# Patient Record
Sex: MALE | Race: WHITE | NOT HISPANIC OR LATINO | Employment: FULL TIME | URBAN - METROPOLITAN AREA
[De-identification: names, ages, dates, MRNs, and addresses within clinical notes are randomized per-mention and may not be internally consistent; named-entity substitution may affect disease eponyms.]

---

## 2018-01-09 ENCOUNTER — HOSPITAL ENCOUNTER (EMERGENCY)
Facility: OTHER | Age: 43
Discharge: HOME | End: 2018-01-09
Attending: EMERGENCY MEDICINE
Payer: COMMERCIAL

## 2018-01-09 VITALS
BODY MASS INDEX: 29.82 KG/M2 | SYSTOLIC BLOOD PRESSURE: 143 MMHG | WEIGHT: 225 LBS | RESPIRATION RATE: 15 BRPM | OXYGEN SATURATION: 97 % | HEIGHT: 73 IN | DIASTOLIC BLOOD PRESSURE: 82 MMHG | HEART RATE: 64 BPM | TEMPERATURE: 98 F

## 2018-01-09 DIAGNOSIS — K29.00 ACUTE GASTRITIS, PRESENCE OF BLEEDING UNSPECIFIED, UNSPECIFIED GASTRITIS TYPE: Primary | ICD-10-CM

## 2018-01-09 LAB
ALBUMIN SERPL BCP-MCNC: 3.4 G/DL
ALP SERPL-CCNC: 86 U/L
ALT SERPL W/O P-5'-P-CCNC: 54 U/L
ANION GAP SERPL CALC-SCNC: 3 MMOL/L
AST SERPL-CCNC: 41 U/L
BASOPHILS # BLD AUTO: 0.02 K/UL
BASOPHILS NFR BLD: 0.3 %
BILIRUB SERPL-MCNC: 0.9 MG/DL
BILIRUB UR QL STRIP: NEGATIVE
BUN SERPL-MCNC: 9 MG/DL
CALCIUM SERPL-MCNC: 8.7 MG/DL
CHLORIDE SERPL-SCNC: 107 MMOL/L
CLARITY UR: CLEAR
CO2 SERPL-SCNC: 30 MMOL/L
COLOR UR: YELLOW
CREAT SERPL-MCNC: 1 MG/DL
DIFFERENTIAL METHOD: ABNORMAL
EOSINOPHIL # BLD AUTO: 0.1 K/UL
EOSINOPHIL NFR BLD: 1.1 %
ERYTHROCYTE [DISTWIDTH] IN BLOOD BY AUTOMATED COUNT: 13 %
EST. GFR  (AFRICAN AMERICAN): >60 ML/MIN/1.73 M^2
EST. GFR  (NON AFRICAN AMERICAN): >60 ML/MIN/1.73 M^2
GLUCOSE SERPL-MCNC: 84 MG/DL
GLUCOSE UR QL STRIP: NEGATIVE
HCT VFR BLD AUTO: 48.6 %
HGB BLD-MCNC: 15.4 G/DL
HGB UR QL STRIP: ABNORMAL
KETONES UR QL STRIP: NEGATIVE
LEUKOCYTE ESTERASE UR QL STRIP: NEGATIVE
LIPASE SERPL-CCNC: 6 U/L
LYMPHOCYTES # BLD AUTO: 2.6 K/UL
LYMPHOCYTES NFR BLD: 35.8 %
MCH RBC QN AUTO: 29.6 PG
MCHC RBC AUTO-ENTMCNC: 31.7 G/DL
MCV RBC AUTO: 94 FL
MONOCYTES # BLD AUTO: 0.4 K/UL
MONOCYTES NFR BLD: 5.8 %
NEUTROPHILS # BLD AUTO: 4.1 K/UL
NEUTROPHILS NFR BLD: 56.6 %
NITRITE UR QL STRIP: NEGATIVE
PH UR STRIP: 8 [PH] (ref 5–8)
PLATELET # BLD AUTO: 242 K/UL
PMV BLD AUTO: 9.4 FL
POTASSIUM SERPL-SCNC: 4.2 MMOL/L
PROT SERPL-MCNC: 6.7 G/DL
PROT UR QL STRIP: NEGATIVE
RBC # BLD AUTO: 5.2 M/UL
SODIUM SERPL-SCNC: 140 MMOL/L
SP GR UR STRIP: 1.01 (ref 1–1.03)
URN SPEC COLLECT METH UR: ABNORMAL
UROBILINOGEN UR STRIP-ACNC: 1 EU/DL
WBC # BLD AUTO: 7.29 K/UL

## 2018-01-09 PROCEDURE — 96374 THER/PROPH/DIAG INJ IV PUSH: CPT

## 2018-01-09 PROCEDURE — 93005 ELECTROCARDIOGRAM TRACING: CPT

## 2018-01-09 PROCEDURE — 85025 COMPLETE CBC W/AUTO DIFF WBC: CPT

## 2018-01-09 PROCEDURE — 63600175 PHARM REV CODE 636 W HCPCS: Performed by: EMERGENCY MEDICINE

## 2018-01-09 PROCEDURE — 99284 EMERGENCY DEPT VISIT MOD MDM: CPT | Mod: 25

## 2018-01-09 PROCEDURE — 80053 COMPREHEN METABOLIC PANEL: CPT

## 2018-01-09 PROCEDURE — 83690 ASSAY OF LIPASE: CPT

## 2018-01-09 PROCEDURE — S0028 INJECTION, FAMOTIDINE, 20 MG: HCPCS | Performed by: EMERGENCY MEDICINE

## 2018-01-09 PROCEDURE — 25000003 PHARM REV CODE 250: Performed by: EMERGENCY MEDICINE

## 2018-01-09 PROCEDURE — 93010 ELECTROCARDIOGRAM REPORT: CPT | Mod: ,,, | Performed by: INTERNAL MEDICINE

## 2018-01-09 PROCEDURE — 96375 TX/PRO/DX INJ NEW DRUG ADDON: CPT

## 2018-01-09 PROCEDURE — 81003 URINALYSIS AUTO W/O SCOPE: CPT

## 2018-01-09 RX ORDER — OMEPRAZOLE 40 MG/1
40 CAPSULE, DELAYED RELEASE ORAL DAILY
Qty: 30 CAPSULE | Refills: 2 | Status: SHIPPED | OUTPATIENT
Start: 2018-01-09 | End: 2019-02-11 | Stop reason: ALTCHOICE

## 2018-01-09 RX ORDER — FAMOTIDINE 10 MG/ML
20 INJECTION INTRAVENOUS
Status: COMPLETED | OUTPATIENT
Start: 2018-01-09 | End: 2018-01-09

## 2018-01-09 RX ORDER — SUCRALFATE 1 G/1
1 TABLET ORAL
Qty: 56 TABLET | Refills: 1 | Status: SHIPPED | OUTPATIENT
Start: 2018-01-09 | End: 2019-02-11 | Stop reason: ALTCHOICE

## 2018-01-09 RX ORDER — ONDANSETRON 2 MG/ML
4 INJECTION INTRAMUSCULAR; INTRAVENOUS
Status: COMPLETED | OUTPATIENT
Start: 2018-01-09 | End: 2018-01-09

## 2018-01-09 RX ADMIN — SODIUM CHLORIDE 1000 ML: 0.9 INJECTION, SOLUTION INTRAVENOUS at 01:01

## 2018-01-09 RX ADMIN — FAMOTIDINE 20 MG: 10 INJECTION, SOLUTION INTRAVENOUS at 12:01

## 2018-01-09 RX ADMIN — ONDANSETRON 4 MG: 2 INJECTION, SOLUTION INTRAMUSCULAR; INTRAVENOUS at 12:01

## 2018-01-09 RX ADMIN — LIDOCAINE HYDROCHLORIDE: 20 SOLUTION ORAL; TOPICAL at 12:01

## 2018-01-09 NOTE — ED PROVIDER NOTES
"Encounter Date: 1/9/2018    SCRIBE #1 NOTE: I, Noni Hernandez, am scribing for, and in the presence of, Dr. Dangelo.       History     Chief Complaint   Patient presents with    Abdominal Pain     epigastric pain and LUQ pain x1 month w/ decreased appetite or feeling of fullness w/o intake of food worsening to 10/10 upon waking today     Time seen by provider: 12:16 PM    This is a 42 y.o. male, with history of GERD, who presents with complaint of abdominal pain which started years ago but worsened as of this morning. Onset occurred while the patient was getting ready for work and has remained constant since. Abdominal pain is rated "8 or 9/10" in severity and is located on the left side of the abdomen with radiation to the central abdomen. Patient describes central abdominal pain as "tightness." He also states that "I can't eat anything I just feel full." The patient denies fever, chills, nausea, vomiting, diarrhea, constipation, back pain, chest pain, shortness of breath, cough, congestion, rhinorrhea, sore throat, dysuria, hematochezia, light-headedness, or dizziness. His last bowel movement was one day ago and normal.     Patient states that he is compliant to daily Prilosec and notes that pain will radiate upward toward throat with missed doses he does not have any chest pain at present. He admits to occasional use of Aleve for headaches, but does not take any NSAIDs regularly. Patient also admits to occasional use of ETOH, but denies use of tobacco or illicit drugs. He has not yet had EGD scope performed - he states that he followed up with a GI doctor last week but was unable to pay the money they were asking for endoscope. The patient does not identify a surgical history of appendectomy or cholecystectomy. He reports no other pertinent medical issues. Patient has no additional complaints.      The history is provided by the patient.     Review of patient's allergies indicates:  No Known Allergies  No past " "medical history on file.  No past surgical history on file.  No family history on file.  Social History   Substance Use Topics    Smoking status: Never Smoker    Smokeless tobacco: Former User     Types: Snuff    Alcohol use Yes      Comment: "occasionally" once a month     Review of Systems   Constitutional: Positive for appetite change (decreased). Negative for chills and fever.   HENT: Negative for congestion, rhinorrhea and sore throat.    Respiratory: Negative for cough and shortness of breath.    Cardiovascular: Negative for chest pain.   Gastrointestinal: Positive for abdominal pain. Negative for blood in stool, constipation, diarrhea, nausea and vomiting.   Genitourinary: Negative for dysuria.   Musculoskeletal: Negative for back pain.   Neurological: Negative for dizziness and light-headedness.       Physical Exam     Initial Vitals [01/09/18 1139]   BP Pulse Resp Temp SpO2   (!) 171/95 92 18 98.1 °F (36.7 °C) 97 %      MAP       120.33             Physical Exam    Nursing note and vitals reviewed.  Constitutional: He appears well-developed and well-nourished. He is not diaphoretic. No distress.   HENT:   Head: Normocephalic and atraumatic.   Mouth/Throat: Oropharynx is clear and moist.   Eyes: Conjunctivae and EOM are normal. Pupils are equal, round, and reactive to light. No scleral icterus.   Neck: Normal range of motion. Neck supple.   Cardiovascular: Normal rate, regular rhythm and normal heart sounds. Exam reveals no gallop and no friction rub.    No murmur heard.  Pulmonary/Chest: Breath sounds normal. No respiratory distress. He has no wheezes. He has no rhonchi. He has no rales.   Abdominal: Soft. He exhibits no distension. There is tenderness. There is no rebound and no guarding.   Hyperactive bowel sounds. Epigastric tenderness to palpation.   Genitourinary:   Genitourinary Comments: Single external hemorrhoid which is not bleeding or thrombosed. No stool present in the rectal vault. No " palpable internal hemorrhoids.   Musculoskeletal: Normal range of motion. He exhibits no edema or tenderness.   Neurological: He is alert and oriented to person, place, and time.   Skin: Skin is warm and dry. Capillary refill takes less than 2 seconds. No rash and no abscess noted. No erythema. No pallor.   Psychiatric: He has a normal mood and affect. His behavior is normal. Judgment and thought content normal.         ED Course   Procedures  Labs Reviewed   CBC W/ AUTO DIFFERENTIAL - Abnormal; Notable for the following:        Result Value    MCHC 31.7 (*)     All other components within normal limits   COMPREHENSIVE METABOLIC PANEL - Abnormal; Notable for the following:     CO2 30 (*)     Albumin 3.4 (*)     AST 41 (*)     ALT 54 (*)     Anion Gap 3 (*)     All other components within normal limits   URINALYSIS - Abnormal; Notable for the following:     Occult Blood UA Trace (*)     All other components within normal limits   LIPASE     Imaging Results          X-Ray Abdomen Flat And Erect (Final result)  Result time 01/09/18 12:57:45    Final result by Yue Ahmadi MD (01/09/18 12:57:45)                 Impression:    No dilated bowel loops to suggest obstruction.      Electronically signed by: YUE AHMADI MD  Date:     01/09/18  Time:    12:57              Narrative:    EXAM: ABDOMEN FLAT AND ERECT    CLINICAL INDICATION:  epigastric pain.    TECHNIQUE:  Supine and upright views of the abdomen were obtained.    FINDINGS:  No dilated bowel loops are seen.   There is no evidence of subdiaphragmatic free air. There is no evidence of mass effect.  No abnormal calcifications are detected. No osseous abnormalities are seen.                              EKG Readings: (Independently Interpreted)   Initial Reading: No STEMI.   Normal sinus rhythm at a rate of 89 bpm. No STEMI. Motion artifact present.          Medical Decision Making:   Independently Interpreted Test(s):   I have ordered and independently  interpreted X-rays - see prior notes.  I have ordered and independently interpreted EKG Reading(s) - see prior notes  Clinical Tests:   Lab Tests: Ordered and Reviewed  Radiological Study: Ordered and Reviewed  Medical Tests: Ordered and Reviewed  ED Management:  Urgent evaluation a 42-year-old male with complaint of epigastric abdominal pain, worsened from baseline.  Vital signs reveal hypertension, afebrile.  Blood pressure improved without specific intervention during ER course.  Physical exam revealed epigastric tenderness, but a nonsurgical abdomen.  X-ray shows no perforation or obstruction.  Labs are benign with no evidence of UTI, pancreatitis, no anemia, no major electrolyte disturbance or renal insufficiency.  There was it trivial elevation in LFTs but not commensurate with true hepatitis, and this is not the location of patient's pain.  He was treated with fluids, GI cocktail, and Pepcid with improvement.  He was discharged in improved condition to take Carafate and high-dose Prilosec.  He was advised to follow-up with GI as he will need endoscopy for further evaluation.            Scribe Attestation:   Scribe #1: I performed the above scribed service and the documentation accurately describes the services I performed. I attest to the accuracy of the note.    Attending Attestation:           Physician Attestation for Scribe:  Physician Attestation Statement for Scribe #1: I, Dr. Dangelo, reviewed documentation, as scribed by Noni Hernandez in my presence, and it is both accurate and complete.                 ED Course      Clinical Impression:     1. Acute gastritis, presence of bleeding unspecified, unspecified gastritis type                                 Aurelia Dangelo MD  01/09/18 1855

## 2019-02-11 ENCOUNTER — OFFICE VISIT (OUTPATIENT)
Dept: PRIMARY CARE CLINIC | Facility: CLINIC | Age: 44
End: 2019-02-11
Payer: COMMERCIAL

## 2019-02-11 VITALS
WEIGHT: 214.31 LBS | SYSTOLIC BLOOD PRESSURE: 155 MMHG | OXYGEN SATURATION: 95 % | DIASTOLIC BLOOD PRESSURE: 93 MMHG | RESPIRATION RATE: 18 BRPM | HEIGHT: 73 IN | HEART RATE: 84 BPM | BODY MASS INDEX: 28.4 KG/M2 | TEMPERATURE: 98 F

## 2019-02-11 DIAGNOSIS — E78.00 HYPERCHOLESTEROLEMIA: ICD-10-CM

## 2019-02-11 DIAGNOSIS — Z12.5 PROSTATE CANCER SCREENING: ICD-10-CM

## 2019-02-11 DIAGNOSIS — Z13.6 ENCOUNTER FOR SCREENING FOR CARDIOVASCULAR DISORDERS: ICD-10-CM

## 2019-02-11 DIAGNOSIS — Z00.00 ROUTINE PHYSICAL EXAMINATION: Primary | ICD-10-CM

## 2019-02-11 DIAGNOSIS — R74.01 TRANSAMINITIS: ICD-10-CM

## 2019-02-11 DIAGNOSIS — K21.9 GASTROESOPHAGEAL REFLUX DISEASE WITHOUT ESOPHAGITIS: ICD-10-CM

## 2019-02-11 PROCEDURE — 99999 PR PBB SHADOW E&M-EST. PATIENT-LVL III: CPT | Mod: PBBFAC,,, | Performed by: NURSE PRACTITIONER

## 2019-02-11 PROCEDURE — 99396 PR PREVENTIVE VISIT,EST,40-64: ICD-10-PCS | Mod: S$GLB,,, | Performed by: NURSE PRACTITIONER

## 2019-02-11 PROCEDURE — 99999 PR PBB SHADOW E&M-EST. PATIENT-LVL III: ICD-10-PCS | Mod: PBBFAC,,, | Performed by: NURSE PRACTITIONER

## 2019-02-11 PROCEDURE — 99396 PREV VISIT EST AGE 40-64: CPT | Mod: S$GLB,,, | Performed by: NURSE PRACTITIONER

## 2019-02-11 RX ORDER — OMEPRAZOLE 20 MG/1
20 CAPSULE, DELAYED RELEASE ORAL DAILY PRN
COMMUNITY

## 2019-02-11 NOTE — PROGRESS NOTES
"Chief Complaint  Chief Complaint   Patient presents with    Annual Exam       HPI    Williams Coronado is a 43 y.o. male that presents for routine physical examination, establish care.  Last primary care physician Dr. Ledesma in 2009.  No recent lab work.  Patient with no complaints at this time.  Presents to clinic requesting a physical for insurance.  Patient is a nonsmoker with occasional alcohol intake socially.  Denies drug use.  Exercise routine with daily workouts.  Only medical history includes GERD which patient reports is controlled with Prilosec 20 mg as needed for symptoms.      PAST MEDICAL HISTORY:  History reviewed. No pertinent past medical history.    PAST SURGICAL HISTORY:  Past Surgical History:   Procedure Laterality Date    TONSILLECTOMY         SOCIAL HISTORY:  Social History     Socioeconomic History    Marital status:      Spouse name: Not on file    Number of children: Not on file    Years of education: Not on file    Highest education level: Not on file   Social Needs    Financial resource strain: Not on file    Food insecurity - worry: Not on file    Food insecurity - inability: Not on file    Transportation needs - medical: Not on file    Transportation needs - non-medical: Not on file   Occupational History    Not on file   Tobacco Use    Smoking status: Never Smoker    Smokeless tobacco: Former User     Types: Snuff   Substance and Sexual Activity    Alcohol use: Yes     Comment: "occasionally" once a month    Drug use: No    Sexual activity: Yes   Other Topics Concern    Not on file   Social History Narrative    Not on file       FAMILY HISTORY:  Family History   Family history unknown: Yes       ALLERGIES AND MEDICATIONS: updated and reviewed.  Review of patient's allergies indicates:  No Known Allergies  No current outpatient medications on file.     No current facility-administered medications for this visit.          ROS  Review of Systems " "  Constitutional: Negative for chills, diaphoresis, fever and unexpected weight change.   HENT: Negative for ear pain, sinus pain and sore throat.    Respiratory: Negative for cough, shortness of breath and wheezing.    Cardiovascular: Negative for chest pain and palpitations.   Gastrointestinal: Negative for blood in stool, diarrhea, nausea and vomiting.   Genitourinary: Negative for dysuria and hematuria.   Skin: Negative for rash and wound.   Neurological: Negative for seizures, syncope and headaches.   Psychiatric/Behavioral: Negative for dysphoric mood and sleep disturbance. The patient is not nervous/anxious.            PHYSICAL EXAM  Vitals:    02/11/19 0901   BP: (!) 140/93   BP Location: Right arm   Patient Position: Sitting   BP Method: Large (Automatic)   Pulse: 84   Resp: 18   Temp: 97.9 °F (36.6 °C)   TempSrc: Oral   SpO2: 95%   Weight: 97.2 kg (214 lb 4.8 oz)   Height: 6' 1" (1.854 m)    Body mass index is 28.27 kg/m².  Weight: 97.2 kg (214 lb 4.8 oz)   Height: 6' 1" (185.4 cm)     Physical Exam   Constitutional: He is oriented to person, place, and time. He appears well-developed and well-nourished.   HENT:   Head: Normocephalic.   Right Ear: Tympanic membrane normal.   Left Ear: Tympanic membrane normal.   Mouth/Throat: Uvula is midline, oropharynx is clear and moist and mucous membranes are normal.   Eyes: Conjunctivae are normal.   Cardiovascular: Normal rate, regular rhythm, normal heart sounds and normal pulses.   No murmur heard.  Pulses:       Radial pulses are 2+ on the right side, and 2+ on the left side.   No LE swelling noted   Pulmonary/Chest: Effort normal and breath sounds normal. He has no wheezes.   Abdominal: Soft. Bowel sounds are normal. There is no tenderness.   Musculoskeletal: He exhibits no edema.   Lymphadenopathy:     He has no cervical adenopathy.   Neurological: He is alert and oriented to person, place, and time.   Skin: Skin is warm and dry. No rash noted.   Psychiatric: " He has a normal mood and affect.         Health Maintenance       Date Due Completion Date    Lipid Panel 1973 ---    TETANUS VACCINE 08/08/1991 ---    Influenza Vaccine 08/01/2018 ---            Assessment & Plan    Problem List Items Addressed This Visit        Unprioritized    Gastroesophageal reflux disease without esophagitis      Other Visit Diagnoses     Routine physical examination    -  Primary    Relevant Orders    CBC auto differential    Comprehensive metabolic panel    TSH    Encounter for screening for cardiovascular disorders        Relevant Orders    Lipid panel    Prostate cancer screening        Relevant Orders    PSA, Screening               Follow-up: Follow-up in about 6 months (around 8/11/2019).    Niru Castelan    Medication List with Changes/Refills   Discontinued Medications    OMEPRAZOLE (PRILOSEC) 40 MG CAPSULE    Take 1 capsule (40 mg total) by mouth once daily.    SUCRALFATE (CARAFATE) 1 GRAM TABLET    Take 1 tablet (1 g total) by mouth 4 (four) times daily before meals and nightly.

## 2019-03-13 ENCOUNTER — TELEPHONE (OUTPATIENT)
Dept: PRIMARY CARE CLINIC | Facility: CLINIC | Age: 44
End: 2019-03-13

## 2019-03-13 DIAGNOSIS — E78.00 HYPERCHOLESTEROLEMIA: Primary | ICD-10-CM

## 2019-03-13 RX ORDER — ATORVASTATIN CALCIUM 10 MG/1
10 TABLET, FILM COATED ORAL DAILY
Qty: 90 TABLET | Refills: 3 | Status: SHIPPED | OUTPATIENT
Start: 2019-03-13 | End: 2019-03-15 | Stop reason: SDUPTHER

## 2019-03-14 ENCOUNTER — TELEPHONE (OUTPATIENT)
Dept: PRIMARY CARE CLINIC | Facility: CLINIC | Age: 44
End: 2019-03-14

## 2019-03-14 DIAGNOSIS — E78.00 HYPERCHOLESTEROLEMIA: Primary | ICD-10-CM

## 2019-03-14 NOTE — TELEPHONE ENCOUNTER
----- Message from Beth Sparrow sent at 3/14/2019  8:45 AM CDT -----  Contact: Patient  Type: Needs Medical Advice    Who Called:  Williams patient  Symptoms (please be specific):  N/A  How long has patient had these symptoms:  N/A  Pharmacy name and phone #:    [facility]  [place]  Best Call Back Number: [phone]  Additional Information: Calling to ask if he can change Rx atorvastatin (LIPITOR) 10 MG tablet to Repatha Sure Shot Pen. Please advise. Thanks.

## 2019-03-15 RX ORDER — ATORVASTATIN CALCIUM 10 MG/1
10 TABLET, FILM COATED ORAL DAILY
Qty: 90 TABLET | Refills: 3 | Status: SHIPPED | OUTPATIENT
Start: 2019-03-15 | End: 2019-06-19

## 2019-03-15 NOTE — TELEPHONE ENCOUNTER
Discussed with patient that I do not think that starting on repatha is an appropriate step in his care.   Generally, it is recommended that a patient failed therapy on the maximum dose of statins for at least 3 months before starting with path.  Offered a consult for Cardiology for any further questions.  Patient agreed to attempt Lipitor at this time.  Instructed him will closely monitor his LFTs in the future.

## 2019-03-15 NOTE — TELEPHONE ENCOUNTER
----- Message from Jonathan Ye sent at 3/15/2019 10:51 AM CDT -----  Type: Needs Medical Advice    Who Called:  Patient    Pharmacy name and phone #:    [facility]  [address], [place] [address] [phone]    Best Call Back Number: [phone]  Additional Information: Patient states that he would like a callback as soon as possible regarding his prescriptions.  He states that he needs Repatha instead of the Lipitor with Statin and he needs it sent to the [facility] instead of [facility].

## 2019-03-22 ENCOUNTER — TELEPHONE (OUTPATIENT)
Dept: PRIMARY CARE CLINIC | Facility: CLINIC | Age: 44
End: 2019-03-22

## 2019-03-22 NOTE — TELEPHONE ENCOUNTER
----- Message from Beth Sparrow sent at 3/22/2019 10:59 AM CDT -----  Contact: Patient  Type: Needs Medical Advice    Who Called:  Williams patient  Symptoms (please be specific):  N/A  How long has patient had these symptoms:  N/A  Pharmacy name and phone #:  N?A  Best Call Back Number: [phone]  Additional Information: Calling to let you know the Rx atorvastatin (LIPITOR) 10 MG tablet is making him nauseated all the time. Please call him. Thanks.

## 2019-03-25 NOTE — TELEPHONE ENCOUNTER
Please let patient know that Dr. Ledesma would like him to continue for 1-2 more weeks to evaluate for improvement.

## 2019-06-05 ENCOUNTER — PATIENT OUTREACH (OUTPATIENT)
Dept: ADMINISTRATIVE | Facility: HOSPITAL | Age: 44
End: 2019-06-05

## 2019-06-05 NOTE — LETTER
June 5, 2019    Williams Coronado  [facility] [place] [address] [facility]  Jameson Ledesma MD   [address] [place] [address]  Phone: [phone]  Fax: [phone]   Dear Richard, [facility] is committed to your overall health.  To help you get the most out of each of your visits, we will review your information to make sure you are up to date on all of your recommended tests and/or procedures.      Dr. Ledesma has found that you may be due for a Tetanus Vaccine.      If you have had any of the above done at another facility, please bring the records or information with you so that your record at [facility] will be complete. You can also sign a release of information at your upcoming appointment so we can obtain the tests/procedures. If you are currently taking medication, please bring it with you to your appointment for review.    Also, if you have any type of Advanced Directives, please bring them with you to your office visit so we may scan them into your chart.       Thanks,    MD Zechariah Mon LPN  Clinical Care Coordinator

## 2019-06-19 ENCOUNTER — CLINICAL SUPPORT (OUTPATIENT)
Dept: PRIMARY CARE CLINIC | Facility: CLINIC | Age: 44
End: 2019-06-19
Payer: COMMERCIAL

## 2019-06-19 DIAGNOSIS — E78.00 HYPERCHOLESTEROLEMIA: ICD-10-CM

## 2019-06-19 LAB
ALBUMIN SERPL BCP-MCNC: 4.3 G/DL (ref 3.5–5.2)
ALP SERPL-CCNC: 79 U/L (ref 38–126)
ALT SERPL W/O P-5'-P-CCNC: 36 U/L (ref 17–63)
ANION GAP SERPL CALC-SCNC: 9 MMOL/L (ref 8–16)
AST SERPL-CCNC: 36 U/L (ref 15–41)
BILIRUB SERPL-MCNC: 1.2 MG/DL (ref 0.3–1.2)
BUN SERPL-MCNC: 18 MG/DL (ref 6–20)
CALCIUM SERPL-MCNC: 9.4 MG/DL (ref 8.6–10)
CHLORIDE SERPL-SCNC: 102 MMOL/L (ref 101–111)
CHOLEST SERPL-MCNC: 241 MG/DL (ref 80–200)
CHOLEST/HDLC SERPL: 5.5 {RATIO} (ref 2–5)
CO2 SERPL-SCNC: 29 MMOL/L (ref 23–29)
CREAT SERPL-MCNC: 1 MG/DL (ref 0.5–1.4)
EST. GFR  (AFRICAN AMERICAN): >60 ML/MIN/1.73 M^2
EST. GFR  (NON AFRICAN AMERICAN): >60 ML/MIN/1.73 M^2
GLUCOSE SERPL-MCNC: 90 MG/DL (ref 74–118)
HDLC SERPL-MCNC: 44 MG/DL (ref 40–75)
HDLC SERPL: 18.3 % (ref 20–50)
LDLC SERPL CALC-MCNC: 162 MG/DL
NONHDLC SERPL-MCNC: 197 MG/DL
POTASSIUM SERPL-SCNC: 3.7 MMOL/L (ref 3.5–5.1)
PROT SERPL-MCNC: 7.5 G/DL (ref 6–8.4)
SODIUM SERPL-SCNC: 140 MMOL/L (ref 136–145)
TRIGL SERPL-MCNC: 175 MG/DL (ref 30–150)

## 2019-06-19 PROCEDURE — 80053 COMPREHEN METABOLIC PANEL: CPT

## 2019-06-19 PROCEDURE — 80061 LIPID PANEL: CPT

## 2019-07-12 DIAGNOSIS — E78.2 MIXED HYPERLIPIDEMIA: Primary | ICD-10-CM

## 2020-01-24 NOTE — ADDENDUM NOTE
-- DO NOT REPLY / DO NOT REPLY ALL --  -- Message is from the [facility]--    General Patient Message      Reason for Call: Patient returned a call he believes was regarding echo results. Please give him a call back asap.    Caller Information       Type Contact Phone    01/24/2020 08:50 AM Phone (Incoming) Javier Herbert (Self) [phone] (H)          Alternative phone number: none    Turnaround time given to caller:   \"This message will be sent to [state Provider's name]. The clinical team will fulfill your request as soon as they review your message.\"}     Addended by: ABEL JUAN on: 2/11/2019 01:21 PM     Modules accepted: Orders

## 2021-09-20 ENCOUNTER — IMMUNIZATION (OUTPATIENT)
Dept: PHARMACY | Facility: CLINIC | Age: 46
End: 2021-09-20
Payer: COMMERCIAL

## 2021-09-20 DIAGNOSIS — Z23 NEED FOR VACCINATION: Primary | ICD-10-CM

## 2022-01-26 ENCOUNTER — OFFICE VISIT (OUTPATIENT)
Dept: PRIMARY CARE CLINIC | Facility: CLINIC | Age: 47
End: 2022-01-26
Payer: COMMERCIAL

## 2022-01-26 VITALS
WEIGHT: 191.5 LBS | RESPIRATION RATE: 16 BRPM | DIASTOLIC BLOOD PRESSURE: 76 MMHG | OXYGEN SATURATION: 95 % | HEART RATE: 85 BPM | TEMPERATURE: 98 F | HEIGHT: 73 IN | SYSTOLIC BLOOD PRESSURE: 120 MMHG | BODY MASS INDEX: 25.38 KG/M2

## 2022-01-26 DIAGNOSIS — Z76.89 ENCOUNTER TO ESTABLISH CARE: Primary | ICD-10-CM

## 2022-01-26 DIAGNOSIS — Z11.4 ENCOUNTER FOR SCREENING FOR HIV: ICD-10-CM

## 2022-01-26 DIAGNOSIS — Z11.59 NEED FOR HEPATITIS C SCREENING TEST: ICD-10-CM

## 2022-01-26 DIAGNOSIS — Z13.6 SCREENING FOR CARDIOVASCULAR CONDITION: ICD-10-CM

## 2022-01-26 DIAGNOSIS — N52.9 ERECTILE DYSFUNCTION, UNSPECIFIED ERECTILE DYSFUNCTION TYPE: ICD-10-CM

## 2022-01-26 PROCEDURE — 93010 EKG 12-LEAD: ICD-10-PCS | Mod: S$GLB,,, | Performed by: INTERNAL MEDICINE

## 2022-01-26 PROCEDURE — 3074F PR MOST RECENT SYSTOLIC BLOOD PRESSURE < 130 MM HG: ICD-10-PCS | Mod: CPTII,S$GLB,, | Performed by: STUDENT IN AN ORGANIZED HEALTH CARE EDUCATION/TRAINING PROGRAM

## 2022-01-26 PROCEDURE — 93005 ELECTROCARDIOGRAM TRACING: CPT | Mod: S$GLB,,, | Performed by: STUDENT IN AN ORGANIZED HEALTH CARE EDUCATION/TRAINING PROGRAM

## 2022-01-26 PROCEDURE — 3008F BODY MASS INDEX DOCD: CPT | Mod: CPTII,S$GLB,, | Performed by: STUDENT IN AN ORGANIZED HEALTH CARE EDUCATION/TRAINING PROGRAM

## 2022-01-26 PROCEDURE — 3008F PR BODY MASS INDEX (BMI) DOCUMENTED: ICD-10-PCS | Mod: CPTII,S$GLB,, | Performed by: STUDENT IN AN ORGANIZED HEALTH CARE EDUCATION/TRAINING PROGRAM

## 2022-01-26 PROCEDURE — 3078F DIAST BP <80 MM HG: CPT | Mod: CPTII,S$GLB,, | Performed by: STUDENT IN AN ORGANIZED HEALTH CARE EDUCATION/TRAINING PROGRAM

## 2022-01-26 PROCEDURE — 93010 ELECTROCARDIOGRAM REPORT: CPT | Mod: S$GLB,,, | Performed by: INTERNAL MEDICINE

## 2022-01-26 PROCEDURE — 1160F PR REVIEW ALL MEDS BY PRESCRIBER/CLIN PHARMACIST DOCUMENTED: ICD-10-PCS | Mod: CPTII,S$GLB,, | Performed by: STUDENT IN AN ORGANIZED HEALTH CARE EDUCATION/TRAINING PROGRAM

## 2022-01-26 PROCEDURE — 1159F MED LIST DOCD IN RCRD: CPT | Mod: CPTII,S$GLB,, | Performed by: STUDENT IN AN ORGANIZED HEALTH CARE EDUCATION/TRAINING PROGRAM

## 2022-01-26 PROCEDURE — 1160F RVW MEDS BY RX/DR IN RCRD: CPT | Mod: CPTII,S$GLB,, | Performed by: STUDENT IN AN ORGANIZED HEALTH CARE EDUCATION/TRAINING PROGRAM

## 2022-01-26 PROCEDURE — 3078F PR MOST RECENT DIASTOLIC BLOOD PRESSURE < 80 MM HG: ICD-10-PCS | Mod: CPTII,S$GLB,, | Performed by: STUDENT IN AN ORGANIZED HEALTH CARE EDUCATION/TRAINING PROGRAM

## 2022-01-26 PROCEDURE — 1159F PR MEDICATION LIST DOCUMENTED IN MEDICAL RECORD: ICD-10-PCS | Mod: CPTII,S$GLB,, | Performed by: STUDENT IN AN ORGANIZED HEALTH CARE EDUCATION/TRAINING PROGRAM

## 2022-01-26 PROCEDURE — 93005 EKG 12-LEAD: ICD-10-PCS | Mod: S$GLB,,, | Performed by: STUDENT IN AN ORGANIZED HEALTH CARE EDUCATION/TRAINING PROGRAM

## 2022-01-26 PROCEDURE — 99999 PR PBB SHADOW E&M-EST. PATIENT-LVL III: CPT | Mod: PBBFAC,,, | Performed by: STUDENT IN AN ORGANIZED HEALTH CARE EDUCATION/TRAINING PROGRAM

## 2022-01-26 PROCEDURE — 99214 OFFICE O/P EST MOD 30 MIN: CPT | Mod: S$GLB,,, | Performed by: STUDENT IN AN ORGANIZED HEALTH CARE EDUCATION/TRAINING PROGRAM

## 2022-01-26 PROCEDURE — 99999 PR PBB SHADOW E&M-EST. PATIENT-LVL III: ICD-10-PCS | Mod: PBBFAC,,, | Performed by: STUDENT IN AN ORGANIZED HEALTH CARE EDUCATION/TRAINING PROGRAM

## 2022-01-26 PROCEDURE — 3074F SYST BP LT 130 MM HG: CPT | Mod: CPTII,S$GLB,, | Performed by: STUDENT IN AN ORGANIZED HEALTH CARE EDUCATION/TRAINING PROGRAM

## 2022-01-26 PROCEDURE — 99214 PR OFFICE/OUTPT VISIT, EST, LEVL IV, 30-39 MIN: ICD-10-PCS | Mod: S$GLB,,, | Performed by: STUDENT IN AN ORGANIZED HEALTH CARE EDUCATION/TRAINING PROGRAM

## 2022-01-26 RX ORDER — SILDENAFIL 50 MG/1
50 TABLET, FILM COATED ORAL DAILY PRN
Qty: 30 TABLET | Refills: 1 | Status: SHIPPED | OUTPATIENT
Start: 2022-01-26 | End: 2023-01-26

## 2022-01-26 NOTE — PATIENT INSTRUCTIONS
Establish care:   - patient with history of elevated cholesterol and elevated liver enzymes previously, has had significant health improvement since that time.  Will recheck labs today.    Erectile dysfunction:   - patient has been having a couple months of erectile difficulty.  Does admit to a difficult sexual encounter, and some concern from his partner of disinterest that has created anxiety.   - checking EKG, CBC, CMP, and lipid panel today.   - encouraging patient to continue with his healthy lifestyle, keeping hydrated, being active as these are beneficial for overall health.   - starting on trial of Sildenafil 50 mg, can titrate upper down to 2 pills are down to half a pill depending on results of initial use.  Use 30-60 minutes before sexual encounter, typically effective for approximately 4 hours.   - would recommend patient use [employer] phone apps to discount his prescription, website approximates this will cost him 18 dollars versus 300 dollars at [employer].

## 2022-01-26 NOTE — PROGRESS NOTES
"Subjective:           Patient ID: Williams Coronado is a 46 y.o. male who presents today with a chief complaint of est care and discuss ED.    Chief Complaint:   Erectile Dysfunction      History of Present Illness:    45yo male presenting for erectile issues.    States he has been having intermittent issues with erection.   Has been having an issue getting an erection for the last couple months.      Has not been increasing over the last few month.      States that things have been going well.  Has not been having increased anxiety or depression in the last couple months.     After having a difficult sexual encounter was having his partener 2nd guessing his interest and that has now been challenging to achieve or maintain erections since that encounter.    Patient does still get spontaneous erections at times.      Review of Systems   Constitutional: Negative.    HENT: Negative.  Negative for congestion.    Eyes: Negative.    Respiratory: Negative.  Negative for shortness of breath and wheezing.    Cardiovascular: Negative.  Negative for chest pain, palpitations and leg swelling.   Gastrointestinal: Negative.  Negative for constipation, diarrhea, nausea and vomiting.   Endocrine: Negative.    Genitourinary: Negative.  Negative for difficulty urinating, dysuria, flank pain, penile discharge, penile pain, penile swelling, testicular pain and urgency.   Musculoskeletal: Negative.    Skin: Negative.    Allergic/Immunologic: Negative for food allergies.   Psychiatric/Behavioral: The patient is nervous/anxious.            Objective:        Vitals:    01/26/22 0813   BP: 120/76   BP Location: Right arm   Patient Position: Sitting   BP Method: Medium (Manual)   Pulse: 85   Resp: 16   Temp: 98.2 °F (36.8 °C)   TempSrc: Oral   SpO2: 95%   Weight: 86.8 kg (191 lb 7.5 oz)   Height: 6' 1" (1.854 m)       Body mass index is 25.26 kg/m².      Physical Exam  Vitals reviewed.   Constitutional:       General: He is not in acute " distress.     Appearance: Normal appearance. He is not ill-appearing.      Comments: As per BMI.   HENT:      Head: Normocephalic and atraumatic.      Right Ear: External ear normal.      Left Ear: External ear normal.      Nose: Nose normal.   Eyes:      Extraocular Movements: Extraocular movements intact.      Conjunctiva/sclera: Conjunctivae normal.   Cardiovascular:      Rate and Rhythm: Normal rate.   Pulmonary:      Effort: Pulmonary effort is normal. No respiratory distress.   Musculoskeletal:         General: No swelling or deformity.      Cervical back: Normal range of motion.   Neurological:      General: No focal deficit present.      Mental Status: He is alert and oriented to person, place, and time.      Gait: Gait normal.   Psychiatric:         Mood and Affect: Mood normal.             Lab Results   Component Value Date     06/19/2019    K 3.7 06/19/2019     06/19/2019    CO2 29 06/19/2019    BUN 18 06/19/2019    CREATININE 1.0 06/19/2019    ANIONGAP 9 06/19/2019     No results found for: HGBA1C  No results found for: BNP, BNPTRIAGEBLO    Lab Results   Component Value Date    WBC 8.50 02/11/2019    HGB 15.9 02/11/2019    HCT 48.5 02/11/2019     02/11/2019    GRAN 5.5 02/11/2019    GRAN 65.4 02/11/2019     Lab Results   Component Value Date    CHOL 241 (H) 06/19/2019    HDL 44 06/19/2019    LDLCALC 162 (H) 06/19/2019    TRIG 175 (H) 06/19/2019          Current Outpatient Medications:     omeprazole (PRILOSEC) 20 MG capsule, Take 20 mg by mouth daily as needed., Disp: , Rfl:     sildenafiL (VIAGRA) 50 MG tablet, Take 1 tablet (50 mg total) by mouth daily as needed for Erectile Dysfunction (use 30-60 minutes before intercourse)., Disp: 30 tablet, Rfl: 1     Outpatient Encounter Medications as of 1/26/2022   Medication Sig Dispense Refill    omeprazole (PRILOSEC) 20 MG capsule Take 20 mg by mouth daily as needed.      sildenafiL (VIAGRA) 50 MG tablet Take 1 tablet (50 mg total) by  mouth daily as needed for Erectile Dysfunction (use 30-60 minutes before intercourse). 30 tablet 1     No facility-administered encounter medications on file as of 1/26/2022.          Assessment:       1. Encounter to establish care    2. Erectile dysfunction, unspecified erectile dysfunction type    3. Need for hepatitis C screening test    4. Encounter for screening for HIV    5. Screening for cardiovascular condition           Plan:       Encounter to establish care    Erectile dysfunction, unspecified erectile dysfunction type  -     EKG 12-lead  -     CBC Auto Differential; Future; Expected date: 01/26/2022  -     Comprehensive Metabolic Panel; Future; Expected date: 01/26/2022  -     Lipid Panel; Future; Expected date: 01/26/2022  -     sildenafiL (VIAGRA) 50 MG tablet; Take 1 tablet (50 mg total) by mouth daily as needed for Erectile Dysfunction (use 30-60 minutes before intercourse).  Dispense: 30 tablet; Refill: 1    Need for hepatitis C screening test  -     Hepatitis C Antibody; Future; Expected date: 01/26/2022    Encounter for screening for HIV  -     HIV 1/2 Ag/Ab (4th Gen); Future; Expected date: 01/26/2022    Screening for cardiovascular condition  -     TSH; Future; Expected date: 01/26/2022  -     T4, Free; Future; Expected date: 01/26/2022       Establish care:   - patient with history of elevated cholesterol and elevated liver enzymes previously, has had significant health improvement since that time.  Will recheck labs today.    Erectile dysfunction:   - patient has been having a couple months of erectile difficulty.  Does admit to a difficult sexual encounter, and some concern from his partner of disinterest that has created anxiety.   - checking EKG, CBC, CMP, and lipid panel today.   - encouraging patient to continue with his healthy lifestyle, keeping hydrated, being active as these are beneficial for overall health.   - starting on trial of Sildenafil 50 mg, can titrate upper down to 2  pills are down to half a pill depending on results of initial use.  Use 30-60 minutes before sexual encounter, typically effective for approximately 4 hours.   - would recommend patient use [employer] phone apps to discount his prescription, website approximates this will cost him 18 dollars versus 300 dollars at [employer].

## 2022-03-21 ENCOUNTER — PATIENT MESSAGE (OUTPATIENT)
Dept: ADMINISTRATIVE | Facility: HOSPITAL | Age: 47
End: 2022-03-21
Payer: COMMERCIAL

## 2023-05-15 ENCOUNTER — TELEPHONE (OUTPATIENT)
Dept: PRIMARY CARE CLINIC | Facility: CLINIC | Age: 48
End: 2023-05-15
Payer: COMMERCIAL

## 2023-05-15 DIAGNOSIS — N52.9 ERECTILE DYSFUNCTION, UNSPECIFIED ERECTILE DYSFUNCTION TYPE: ICD-10-CM

## 2023-05-15 DIAGNOSIS — L50.9 URTICARIA: Primary | ICD-10-CM

## 2023-05-15 NOTE — TELEPHONE ENCOUNTER
Patient has been in the ED 4 times for allergic reaction to something unknown.  He is requesting blood work    Payal scheduled him on Friday with an allergist at [facility].  He states he is not eating because he doesn't know what is causing.

## 2023-09-18 ENCOUNTER — PATIENT MESSAGE (OUTPATIENT)
Dept: PRIMARY CARE CLINIC | Facility: CLINIC | Age: 48
End: 2023-09-18
Payer: COMMERCIAL

## 2023-10-18 ENCOUNTER — PATIENT MESSAGE (OUTPATIENT)
Dept: CARDIOLOGY | Facility: CLINIC | Age: 48
End: 2023-10-18
Payer: COMMERCIAL

## 2024-09-19 ENCOUNTER — PATIENT MESSAGE (OUTPATIENT)
Dept: PRIMARY CARE CLINIC | Facility: CLINIC | Age: 49
End: 2024-09-19
Payer: COMMERCIAL

## 2025-02-18 ENCOUNTER — PATIENT OUTREACH (OUTPATIENT)
Dept: ADMINISTRATIVE | Facility: HOSPITAL | Age: 50
End: 2025-02-18
Payer: COMMERCIAL